# Patient Record
Sex: MALE
[De-identification: names, ages, dates, MRNs, and addresses within clinical notes are randomized per-mention and may not be internally consistent; named-entity substitution may affect disease eponyms.]

---

## 2021-07-10 ENCOUNTER — APPOINTMENT (OUTPATIENT)
Dept: DISASTER EMERGENCY | Facility: OTHER | Age: 21
End: 2021-07-10
Payer: COMMERCIAL

## 2021-07-10 PROCEDURE — 0001A: CPT

## 2021-07-31 ENCOUNTER — APPOINTMENT (OUTPATIENT)
Dept: DISASTER EMERGENCY | Facility: OTHER | Age: 21
End: 2021-07-31
Payer: COMMERCIAL

## 2021-07-31 PROCEDURE — 0002A: CPT

## 2024-06-10 ENCOUNTER — APPOINTMENT (OUTPATIENT)
Dept: PULMONOLOGY | Facility: CLINIC | Age: 24
End: 2024-06-10
Payer: COMMERCIAL

## 2024-06-10 ENCOUNTER — NON-APPOINTMENT (OUTPATIENT)
Age: 24
End: 2024-06-10

## 2024-06-10 ENCOUNTER — APPOINTMENT (OUTPATIENT)
Dept: PULMONOLOGY | Facility: CLINIC | Age: 24
End: 2024-06-10

## 2024-06-10 VITALS
OXYGEN SATURATION: 98 % | HEART RATE: 54 BPM | BODY MASS INDEX: 21.14 KG/M2 | HEIGHT: 75 IN | DIASTOLIC BLOOD PRESSURE: 60 MMHG | SYSTOLIC BLOOD PRESSURE: 93 MMHG | WEIGHT: 170 LBS

## 2024-06-10 DIAGNOSIS — G47.30 SLEEP APNEA, UNSPECIFIED: ICD-10-CM

## 2024-06-10 DIAGNOSIS — Z78.9 OTHER SPECIFIED HEALTH STATUS: ICD-10-CM

## 2024-06-10 DIAGNOSIS — Z01.812 ENCOUNTER FOR PREPROCEDURAL LABORATORY EXAMINATION: ICD-10-CM

## 2024-06-10 PROBLEM — Z00.00 ENCOUNTER FOR PREVENTIVE HEALTH EXAMINATION: Status: ACTIVE | Noted: 2024-06-10

## 2024-06-10 LAB — POCT - HEMOGLOBIN (HGB), QUANTITATIVE, TRANSCUTANEOUS: 15.5

## 2024-06-10 PROCEDURE — 71046 X-RAY EXAM CHEST 2 VIEWS: CPT

## 2024-06-10 PROCEDURE — 99205 OFFICE O/P NEW HI 60 MIN: CPT | Mod: 25

## 2024-06-10 PROCEDURE — 94729 DIFFUSING CAPACITY: CPT

## 2024-06-10 PROCEDURE — 94010 BREATHING CAPACITY TEST: CPT

## 2024-06-10 PROCEDURE — 88738 HGB QUANT TRANSCUTANEOUS: CPT

## 2024-06-10 PROCEDURE — 94727 GAS DIL/WSHOT DETER LNG VOL: CPT

## 2024-06-10 RX ORDER — OXYBUTYNIN CHLORIDE 5 MG/1
5 TABLET ORAL
Refills: 0 | Status: ACTIVE | COMMUNITY
Start: 2024-06-10

## 2024-06-10 NOTE — PHYSICAL EXAM
[No Acute Distress] : no acute distress [Normal Oropharynx] : normal oropharynx [III] : Mallampati Class: III [Normal Appearance] : normal appearance [Supple] : supple [No Neck Mass] : no neck mass [No JVD] : no jvd [Normal Rate/Rhythm] : normal rate/rhythm [Normal S1, S2] : normal s1, s2 [No Murmurs] : no murmurs [No Resp Distress] : no resp distress [No Acc Muscle Use] : no acc muscle use [Normal Palpation] : normal palpation [Normal Rhythm and Effort] : normal rhythm and effort [Clear to Auscultation Bilaterally] : clear to auscultation bilaterally [Normal to Percussion] : normal to percussion [No Abnormalities] : no abnormalities [Benign] : benign [Not Tender] : not tender [Soft] : soft [No HSM] : no hsm [Normal Bowel Sounds] : normal bowel sounds [Normal Gait] : normal gait [No Clubbing] : no clubbing [No Cyanosis] : no cyanosis [No Edema] : no edema [FROM] : FROM [Normal Color/ Pigmentation] : normal color/ pigmentation [No Focal Deficits] : no focal deficits [Oriented x3] : oriented x3 [Normal Affect] : normal affect

## 2024-06-10 NOTE — PROCEDURE
[FreeTextEntry1] : Chest x-ray PA lateral 2/10/2024 Normal cardiac size clear lung fields are clear Neg  pleural effusion, dominant pulmonary nodule Soft tissue bony structures unremarkable mediastinum unremarkable Overall impression clear lungs  PFT 6/10/24  Tano WNL Lung Volumes normal  DLCO 95 % HGB 15.5

## 2024-06-10 NOTE — DISCUSSION/SUMMARY
[FreeTextEntry1] : Nocturia with essentially episodes of bedwetting There is a incidence of obstructive sleep apnea related to this associated nocturia From a pulmonary sleep perspective we will order a formal home sleep study over 2 nights Pathophysiology of sleep apnea discussed Treatment protocols addressed focusing primarily on CPAP Patient will return post  Sleep study to review data

## 2024-06-10 NOTE — HISTORY OF PRESENT ILLNESS
[Never] : never [Awakes Unrefreshed] : awakes unrefreshed [TextBox_4] : 23-year-old patient Chief complaint nocturia Feels that he is having episodes essentially bedwetting because he cannot make it to the bathroom When he wakes up with sensation of needing to urinate He has been seen by pulmonary physician placed on oxybutynin with some questionable benefit He is not aware that he has loud snoring but he does sleep alone Does not have any other significant sleep disorder symptomatology 45 Holliday score No history of lung disease No history of asthma pneumonia bronchitis Occasionally has some mild baseline fatigue [Awakes with Dry Mouth] : does not awaken with dry mouth [Awakes with Headache] : does not awaken with headache [Snoring] : no snoring

## 2024-07-05 ENCOUNTER — APPOINTMENT (OUTPATIENT)
Dept: PULMONOLOGY | Facility: CLINIC | Age: 24
End: 2024-07-05
Payer: COMMERCIAL

## 2024-07-05 PROCEDURE — 95800 SLP STDY UNATTENDED: CPT | Mod: 52

## 2024-07-06 PROCEDURE — 95800 SLP STDY UNATTENDED: CPT

## 2024-07-07 PROCEDURE — 95800 SLP STDY UNATTENDED: CPT | Mod: 52

## 2024-08-01 ENCOUNTER — APPOINTMENT (OUTPATIENT)
Dept: PULMONOLOGY | Facility: CLINIC | Age: 24
End: 2024-08-01